# Patient Record
Sex: FEMALE | Employment: UNEMPLOYED | URBAN - METROPOLITAN AREA
[De-identification: names, ages, dates, MRNs, and addresses within clinical notes are randomized per-mention and may not be internally consistent; named-entity substitution may affect disease eponyms.]

---

## 2020-06-30 ENCOUNTER — HOSPITAL ENCOUNTER (EMERGENCY)
Age: 30
Discharge: HOME | End: 2020-06-30
Attending: EMERGENCY MEDICINE
Payer: SUBSIDIZED

## 2020-06-30 ENCOUNTER — APPOINTMENT (OUTPATIENT)
Dept: ULTRASOUND IMAGING | Age: 30
End: 2020-06-30
Attending: PHYSICIAN ASSISTANT
Payer: SUBSIDIZED

## 2020-06-30 VITALS
SYSTOLIC BLOOD PRESSURE: 114 MMHG | HEART RATE: 97 BPM | OXYGEN SATURATION: 100 % | WEIGHT: 140 LBS | TEMPERATURE: 98.9 F | DIASTOLIC BLOOD PRESSURE: 78 MMHG | RESPIRATION RATE: 16 BRPM

## 2020-06-30 DIAGNOSIS — N83.201 CYST OF RIGHT OVARY: Primary | ICD-10-CM

## 2020-06-30 LAB
ANION GAP SERPL CALC-SCNC: 2 MMOL/L (ref 3–18)
APPEARANCE UR: CLEAR
BACTERIA URNS QL MICRO: NEGATIVE /HPF
BASOPHILS # BLD: 0 K/UL (ref 0–0.1)
BASOPHILS NFR BLD: 0 % (ref 0–2)
BILIRUB UR QL: NEGATIVE
BUN SERPL-MCNC: 8 MG/DL (ref 7–18)
BUN/CREAT SERPL: 14 (ref 12–20)
CALCIUM SERPL-MCNC: 8.8 MG/DL (ref 8.5–10.1)
CHLORIDE SERPL-SCNC: 109 MMOL/L (ref 100–111)
CO2 SERPL-SCNC: 29 MMOL/L (ref 21–32)
COLOR UR: YELLOW
CREAT SERPL-MCNC: 0.56 MG/DL (ref 0.6–1.3)
DIFFERENTIAL METHOD BLD: ABNORMAL
EOSINOPHIL # BLD: 0.1 K/UL (ref 0–0.4)
EOSINOPHIL NFR BLD: 1 % (ref 0–5)
EPITH CASTS URNS QL MICRO: NORMAL /LPF (ref 0–5)
ERYTHROCYTE [DISTWIDTH] IN BLOOD BY AUTOMATED COUNT: 13.3 % (ref 11.6–14.5)
GLUCOSE SERPL-MCNC: 90 MG/DL (ref 74–99)
GLUCOSE UR STRIP.AUTO-MCNC: NEGATIVE MG/DL
HCG UR QL: NEGATIVE
HCT VFR BLD AUTO: 40.8 % (ref 35–45)
HGB BLD-MCNC: 13.8 G/DL (ref 12–16)
HGB UR QL STRIP: ABNORMAL
KETONES UR QL STRIP.AUTO: NEGATIVE MG/DL
LEUKOCYTE ESTERASE UR QL STRIP.AUTO: NEGATIVE
LYMPHOCYTES # BLD: 1.8 K/UL (ref 0.9–3.6)
LYMPHOCYTES NFR BLD: 21 % (ref 21–52)
MCH RBC QN AUTO: 31 PG (ref 24–34)
MCHC RBC AUTO-ENTMCNC: 33.8 G/DL (ref 31–37)
MCV RBC AUTO: 91.7 FL (ref 74–97)
MONOCYTES # BLD: 0.6 K/UL (ref 0.05–1.2)
MONOCYTES NFR BLD: 7 % (ref 3–10)
NEUTS SEG # BLD: 6 K/UL (ref 1.8–8)
NEUTS SEG NFR BLD: 71 % (ref 40–73)
NITRITE UR QL STRIP.AUTO: NEGATIVE
PH UR STRIP: 7.5 [PH] (ref 5–8)
PLATELET # BLD AUTO: 205 K/UL (ref 135–420)
PMV BLD AUTO: 12.9 FL (ref 9.2–11.8)
POTASSIUM SERPL-SCNC: 3.7 MMOL/L (ref 3.5–5.5)
PROT UR STRIP-MCNC: NEGATIVE MG/DL
RBC # BLD AUTO: 4.45 M/UL (ref 4.2–5.3)
RBC #/AREA URNS HPF: NORMAL /HPF (ref 0–5)
SERVICE CMNT-IMP: NORMAL
SODIUM SERPL-SCNC: 140 MMOL/L (ref 136–145)
SP GR UR REFRACTOMETRY: <1.005 (ref 1–1.03)
UROBILINOGEN UR QL STRIP.AUTO: 0.2 EU/DL (ref 0.2–1)
WBC # BLD AUTO: 8.5 K/UL (ref 4.6–13.2)
WBC URNS QL MICRO: NORMAL /HPF (ref 0–4)
WET PREP GENITAL: NORMAL

## 2020-06-30 PROCEDURE — 81001 URINALYSIS AUTO W/SCOPE: CPT

## 2020-06-30 PROCEDURE — 87491 CHLMYD TRACH DNA AMP PROBE: CPT

## 2020-06-30 PROCEDURE — 85025 COMPLETE CBC W/AUTO DIFF WBC: CPT

## 2020-06-30 PROCEDURE — 87210 SMEAR WET MOUNT SALINE/INK: CPT

## 2020-06-30 PROCEDURE — 99283 EMERGENCY DEPT VISIT LOW MDM: CPT

## 2020-06-30 PROCEDURE — 80048 BASIC METABOLIC PNL TOTAL CA: CPT

## 2020-06-30 PROCEDURE — 76830 TRANSVAGINAL US NON-OB: CPT

## 2020-06-30 PROCEDURE — 81025 URINE PREGNANCY TEST: CPT

## 2020-06-30 PROCEDURE — 74011250637 HC RX REV CODE- 250/637: Performed by: PHYSICIAN ASSISTANT

## 2020-06-30 RX ORDER — IBUPROFEN 600 MG/1
600 TABLET ORAL EVERY 8 HOURS
Qty: 15 TAB | Refills: 0 | Status: SHIPPED | OUTPATIENT
Start: 2020-06-30 | End: 2022-06-02

## 2020-06-30 RX ORDER — IBUPROFEN 600 MG/1
600 TABLET ORAL
Qty: 20 TAB | Refills: 0 | Status: SHIPPED | OUTPATIENT
Start: 2020-06-30 | End: 2022-06-02

## 2020-06-30 RX ORDER — HYDROCODONE BITARTRATE AND ACETAMINOPHEN 5; 325 MG/1; MG/1
1 TABLET ORAL ONCE
Status: COMPLETED | OUTPATIENT
Start: 2020-06-30 | End: 2020-06-30

## 2020-06-30 RX ADMIN — HYDROCODONE BITARTRATE AND ACETAMINOPHEN 1 TABLET: 5; 325 TABLET ORAL at 16:09

## 2020-06-30 NOTE — ED PROVIDER NOTES
023 [facility] EMERGENCY DEPT        Ms. Ave Boles is a 42-year-old female who presents with 3 to 4 hours of right suprapubic pain. She said that she started her menstrual cycle today. She does not believe she could be pregnant. In addition she has had unprotected intercourse in the past 5 days with her regular partner. She is not aware of any STD eyes that he has reported. She cannot really describe the pain as far as cramping versus a sharp pain, but does say it is about a 9 out of 10. Of note the [facility] computer interpretation service was used during the interview. She also does not report any nausea, abdominal pain, pelvic discharge other than her menstrual.  Or any vomiting. Nursing nurses regarding the HPI and triage nursing notes were reviewed. No current facility-administered medications for this encounter. Current Outpatient Medications   Medication Sig    ibuprofen (MOTRIN) 600 mg tablet Take 1 Tab by mouth every eight (8) hours. History reviewed. No pertinent past medical history. History reviewed. No pertinent surgical history. History reviewed. No pertinent family history.     Social History     Socioeconomic History    Marital status:      Spouse name: Not on file    Number of children: Not on file    Years of education: Not on file    Highest education level: Not on file   Occupational History    Not on file   Social Needs    Financial resource strain: Not on file    Food insecurity     Worry: Not on file     Inability: Not on file    Transportation needs     Medical: Not on file     Non-medical: Not on file   Tobacco Use    Smoking status: Never Smoker    Smokeless tobacco: Never Used   Substance and Sexual Activity    Alcohol use: Not on file    Drug use: Not on file    Sexual activity: Not on file   Lifestyle    Physical activity     Days per week: Not on file     Minutes per session: Not on file    Stress: Not on file   Relationships    Social connections     Talks on phone: Not on file     Gets together: Not on file     Attends Gnosticist service: Not on file     Active member of club or organization: Not on file     Attends meetings of clubs or organizations: Not on file     Relationship status: Not on file    Intimate partner violence     Fear of current or ex partner: Not on file     Emotionally abused: Not on file     Physically abused: Not on file     Forced sexual activity: Not on file   Other Topics Concern    Not on file   Social History Narrative    Not on file       No Known Allergies    Patient's primary care provider (as noted in EPIC):  None    Review of Systems   Constitutional: Negative. HENT: Negative. Cardiovascular: Negative. Gastrointestinal: Negative. Genitourinary: Positive for pelvic pain. Negative for difficulty urinating, dysuria and flank pain. Skin: Negative. Neurological: Negative. Visit Vitals  /78   Pulse 97   Temp 98.9 °F (37.2 °C)   Resp 16   Wt 63.5 kg (140 lb)   SpO2 100%       Patient Vitals for the past 12 hrs:   Temp Pulse Resp BP SpO2   06/30/20 1429    114/78    06/30/20 1426 98.9 °F (37.2 °C) 97 16  100 %       PHYSICAL EXAM:    CONSTITUTIONAL:  Alert, in no apparent distress;  well developed;  well nourished. HEAD:  Normocephalic, atraumatic. EYES:  EOMI. Non-icteric sclera. Normal conjunctiva. ENTM:  Nose:  no rhinorrhea. Throat:  no erythema or exudate, mucous membranes moist.  NECK:  No JVD. Supple  RESPIRATORY:  Chest clear, equal breath sounds, good air movement. CARDIOVASCULAR:  Regular rate and rhythm. No murmurs, rubs, or gallops. GI:  Normal bowel sounds, RLQ , suprapubic area tender to touch. Other abdomen soft and non-tender. No rebound or guarding. BACK:  Non-tender. UPPER EXT:  Normal inspection. LOWER EXT:  No edema, no calf tenderness. Distal pulses intact.   NEURO:  Moves all four extremities, and grossly normal motor exam.  SKIN:  No rashes;  Normal for age. PSYCH:  Alert and normal affect. PELVIC: Os does have slight bloody discharge but is closed, no obvious lesions or other discharge. Patient is tender near right ovary with bimanual exam.    DIFFERENTIAL DIAGNOSES/ MEDICAL DECISION MAKING:  Ectopic pregnancy, appendicitis, diverticulitis, constipation related pain, ovarian cyst pain, ovarian torsion, pelvic inflammatory disease, urinary tract infection, abdominal wall pain, obstruction, perforation, pyelonephritis, abscess, referred upper abdominal pain, versus combination of the above and/or numerous other processes/ etiologies.     Abnormal lab results from this emergency department encounter:  Labs Reviewed   URINALYSIS W/ RFLX MICROSCOPIC - Abnormal; Notable for the following components:       Result Value    Specific gravity <1.005 (*)     Blood LARGE (*)     All other components within normal limits   CBC WITH AUTOMATED DIFF - Abnormal; Notable for the following components:    MPV 12.9 (*)     All other components within normal limits   METABOLIC PANEL, BASIC - Abnormal; Notable for the following components:    Anion gap 2 (*)     Creatinine 0.56 (*)     All other components within normal limits   WET PREP   HCG URINE, QL   URINE MICROSCOPIC ONLY   CHLAMYDIA/NEISSERIA AMPLIFICATION       Lab values for this patient within approximately the last 12 hours:  Recent Results (from the past 12 hour(s))   URINALYSIS W/ RFLX MICROSCOPIC    Collection Time: 06/30/20  2:30 PM   Result Value Ref Range    Color YELLOW      Appearance CLEAR      Specific gravity <1.005 (L) 1.005 - 1.030    pH (UA) 7.5 5.0 - 8.0      Protein Negative NEG mg/dL    Glucose Negative NEG mg/dL    Ketone Negative NEG mg/dL    Bilirubin Negative NEG      Blood LARGE (A) NEG      Urobilinogen 0.2 0.2 - 1.0 EU/dL    Nitrites Negative NEG      Leukocyte Esterase Negative NEG     HCG URINE, QL    Collection Time: 06/30/20  2:30 PM   Result Value Ref Range    HCG urine, QL Negative NEG     URINE MICROSCOPIC ONLY    Collection Time: 06/30/20  2:30 PM   Result Value Ref Range    WBC 0 to 3 0 - 4 /hpf    RBC 4 to 10 0 - 5 /hpf    Epithelial cells 1+ 0 - 5 /lpf    Bacteria Negative NEG /hpf   CBC WITH AUTOMATED DIFF    Collection Time: 06/30/20  4:10 PM   Result Value Ref Range    WBC 8.5 4.6 - 13.2 K/uL    RBC 4.45 4.20 - 5.30 M/uL    HGB 13.8 12.0 - 16.0 g/dL    HCT 40.8 35.0 - 45.0 %    MCV 91.7 74.0 - 97.0 FL    MCH 31.0 24.0 - 34.0 PG    MCHC 33.8 31.0 - 37.0 g/dL    RDW 13.3 11.6 - 14.5 %    PLATELET 468 134 - 959 K/uL    MPV 12.9 (H) 9.2 - 11.8 FL    NEUTROPHILS 71 40 - 73 %    LYMPHOCYTES 21 21 - 52 %    MONOCYTES 7 3 - 10 %    EOSINOPHILS 1 0 - 5 %    BASOPHILS 0 0 - 2 %    ABS. NEUTROPHILS 6.0 1.8 - 8.0 K/UL    ABS. LYMPHOCYTES 1.8 0.9 - 3.6 K/UL    ABS. MONOCYTES 0.6 0.05 - 1.2 K/UL    ABS. EOSINOPHILS 0.1 0.0 - 0.4 K/UL    ABS. BASOPHILS 0.0 0.0 - 0.1 K/UL    DF AUTOMATED     METABOLIC PANEL, BASIC    Collection Time: 06/30/20  4:10 PM   Result Value Ref Range    Sodium 140 136 - 145 mmol/L    Potassium 3.7 3.5 - 5.5 mmol/L    Chloride 109 100 - 111 mmol/L    CO2 29 21 - 32 mmol/L    Anion gap 2 (L) 3.0 - 18 mmol/L    Glucose 90 74 - 99 mg/dL    BUN 8 7.0 - 18 MG/DL    Creatinine 0.56 (L) 0.6 - 1.3 MG/DL    BUN/Creatinine ratio 14 12 - 20      GFR est AA >60 >60 ml/min/1.73m2    GFR est non-AA >60 >60 ml/min/1.73m2    Calcium 8.8 8.5 - 10.1 MG/DL   WET PREP    Collection Time: 06/30/20  4:30 PM   Result Value Ref Range    Special Requests: NO SPECIAL REQUESTS      Wet prep NO TRICHOMONAS SEEN      Wet prep NO FUNGAL ELEMENTS SEEN      Wet prep CLUE CELLS ABSENT         Radiologist and cardiologist interpretations if available at time of this note:  No results found.     Medication(s) ordered for patient during this emergency visit encounter:  Medications   HYDROcodone-acetaminophen (NORCO) 5-325 mg per tablet 1 Tab (1 Tab Oral Given 6/30/20 1886)       ED COURSE AND MEDICAL DECISION MAKING:    The patient's pain improved in the ED with the noted medications. On reassessment of the patient, the patient continues to have no surgical abdomen with no rebound nor guarding. The patient does not appear septic by presentation, vital signs and laboratory results. The patient continues to appear non-toxic in the emergency department on reevaluations. IMPRESSION AND MEDICAL DECISION MAKING:  Urine pregnancy test was ordered. Based upon the patient's presentation with noted HPI and PE, along with the work   up done in the emergency department, I believe that the patient is having abdominal pain of uncertain etiology. However, I do believe that the patient is stable and can be discharged home with further outpatient evaluation of the abdominal pain by her primary doctor. In addition her ultrasound was read as a right cystic adnexal lesion with possible scattered foci of flow. I did call Dr. Amrit Go from radiology to clarify and he said he does not feel the patient has any acute flow issues to her right ovary at this time. 1.Pelvic pain. 2, Ovarian Cyst    SPECIFIC PATIENT INSTRUCTIONS FROM THE PHYSICIAN WHO TREATED YOU IN THE ER TODAY:  1. Return if any concerns or worsening of condition(s)  2. Norco for pain not controlled with over the counter ibuprofen. 3. Follow up with your primary doctor in the next 2-4 days for reevaluation. Patient is improved, resting quietly and comfortably. The patient will be discharged home. The patient was reassured that these symptoms do not appear to represent a serious or life threatening condition at this time. Warning signs of worsening condition were discussed and understood by the patient. Based on patient's age, coexisting illness, exam, and the results of this ED evaluation, the decision to treat as an outpatient was made.  Based on the information available at time of discharge, acute pathology requiring immediate intervention was deemed relative unlikely. While it is impossible to completely exclude the possibility of underlying serious disease or worsening of condition, I feel the relative likelihood is extremely low. I discussed this uncertainty with the patient, who understood ED evaluation and treatment and felt comfortable with the outpatient treatment plan. All questions regarding care, test results, and follow up were answered. The patient is stable and appropriate to discharge. They understand that they should return to the emergency department for any new or worsening symptoms. I stressed the importance of follow up for repeat assessment and possibly further evaluation/treatment. Dictation disclaimer:  Please note that this dictation was completed with [employer], the [employer] voice recognition software. Quite often unanticipated grammatical, syntax, homophones, and other interpretive errors are inadvertently transcribed by the computer software. Please disregard these errors. Please excuse any errors that have escaped final proofreading. Coding Diagnoses     Clinical Impression:   1.  Cyst of right ovary        Disposition     Disposition:  [place]    Marietta Villanueva PA-C.

## 2020-06-30 NOTE — ED NOTES
I performed a brief history of the patient here in triage and I have determined that pt will need further treatment and evaluation from the main side ER physician. I have placed initial orders based on the history to help in expediting patients care.      MARYCRUZ Farias 2:27 PM

## 2020-07-01 NOTE — DISCHARGE INSTRUCTIONS
Patient Education        Marletta Layla en el ovario: Instrucciones de cuidado  Functional Ovarian Cyst: Care Instructions  Instrucciones de cuidado    Un quiste funcional en el ovario es un saco que se forma en la superficie del ovario de la cristiano jimmy la ovulación. El saco contiene un óvulo en maduración. Por lo general, el saco desaparece después de que se libera el óvulo. Ken si el óvulo no se libera o si el saco se venkatesh luego de [employer] [place], el saco puede llenarse de líquido y formar un quiste. Los quistes ováricos funcionales son diferentes a los crecimientos ováricos provocados por otros problemas, anuj el cáncer. La mayoría de los quistes ováricos funcionales no causan síntomas y desaparecen por sí mismos. Algunos causan dolor leve. Otros pueden causar dolor ilan cuando se rompen o sangran. La atención de seguimiento es siddharth parte clave de sanches tratamiento y seguridad. Asegúrese de hacer y acudir a todas las citas, y llame a sanches médico si está teniendo problemas. También es siddharth buena idea saber los resultados de jesús exámenes y mantener siddharth lista de los medicamentos que taina. ¿Cómo puede cuidarse en el hogar? · Utilice calor, por ejemplo, de siddharth bolsa de [place], siddharth almohadilla térmica ajustada a temperatura baja o un baño tibio para relajar los músculos tensos y [employer]. · [employer] analgésicos (medicamentos para el dolor) exactamente según las indicaciones. ? Si el médico le recetó un analgésico, tómelo según las indicaciones. ? Si no está tomando un analgésico recetado, pregúntele a sanches médico si puede jesús joana de [employer]. · Evite el estreñimiento. Asegúrese de jesús suficiente cantidad de líquidos e incluya en sanches dieta diaria frutas, verduras y [place]. El estreñimiento no causa quistes ováricos, ken podría empeorar el dolor en la pelvis. ¿Cuándo debes pedir ayuda? Ponder [facility]  en cualquier momento que consideres que necesitas atención de [place].  Por ejemplo, llama si:  · Te desmayaste (perdiste el conocimiento). · Tienes dolor repentino e intenso en el abdomen o la pelvis. Llama a tu médico ahora mismo o busca atención médica inmediata si:  · Tienes un dolor nuevo en el abdomen o la pelvis, o tu dolor empeora. · Tienes sangrado vaginal intenso. [place] significa que empapas las toallas sanitarias o los tampones que sueles usar cada hora, jimmy 2 o más horas. · Te sientes mareada o aturdida, o sientes que te vas a desmayar. · Tienes dolor o [address] [place] especial atención a los cambios en tu [place] y asegúrate de comunicarte con tu médico si:  · Tu dolor no te paresh hacer las cosas que disfrutas. · No mejoras anuj se esperaba. ¿Dónde puede encontrar más información en inglés? Vaya a [email or web address]  Kendall R428 en la búsqueda para aprender más acerca de \"Quiste funcional en el ovario: Instrucciones de cuidado. \"  Revisado: 8 noviembre, 5717               TKFWCGD del contenido: 12.5  © 3503-1709 Healthwise, Incorporated. Las instrucciones de cuidado fueron adaptadas bajo licencia por [employer] (which disclaims liability or warranty for this information). Si usted tiene [place] afección médica o sobre estas instrucciones, siempre pregunte a sanches profesional de [place]. Healthwise, Incorporated niega toda garantía o responsabilidad por sanches uso de esta información.

## 2020-07-02 LAB
C TRACH RRNA SPEC QL NAA+PROBE: NEGATIVE
N GONORRHOEA RRNA SPEC QL NAA+PROBE: NEGATIVE
SPECIMEN SOURCE: NORMAL